# Patient Record
Sex: FEMALE | Race: OTHER | HISPANIC OR LATINO | URBAN - METROPOLITAN AREA
[De-identification: names, ages, dates, MRNs, and addresses within clinical notes are randomized per-mention and may not be internally consistent; named-entity substitution may affect disease eponyms.]

---

## 2023-10-13 ENCOUNTER — APPOINTMENT (RX ONLY)
Dept: URBAN - METROPOLITAN AREA CLINIC 86 | Facility: CLINIC | Age: 33
Setting detail: DERMATOLOGY
End: 2023-10-13

## 2023-10-13 DIAGNOSIS — L259 CONTACT DERMATITIS AND OTHER ECZEMA, UNSPECIFIED CAUSE: ICD-10-CM | Status: INADEQUATELY CONTROLLED

## 2023-10-13 PROBLEM — L23.9 ALLERGIC CONTACT DERMATITIS, UNSPECIFIED CAUSE: Status: ACTIVE | Noted: 2023-10-13

## 2023-10-13 PROCEDURE — ? DEFER

## 2023-10-13 PROCEDURE — 99204 OFFICE O/P NEW MOD 45 MIN: CPT

## 2023-10-13 PROCEDURE — ? COUNSELING

## 2023-10-13 PROCEDURE — ? PRESCRIPTION

## 2023-10-13 PROCEDURE — ? TREATMENT REGIMEN

## 2023-10-13 RX ORDER — TACROLIMUS 1 MG/G
OINTMENT TOPICAL
Qty: 60 | Refills: 0 | Status: ERX | COMMUNITY
Start: 2023-10-13

## 2023-10-13 RX ORDER — CLOBETASOL PROPIONATE 0.5 MG/G
OINTMENT TOPICAL
Qty: 60 | Refills: 0 | Status: ERX | COMMUNITY
Start: 2023-10-13

## 2023-10-13 RX ADMIN — TACROLIMUS: 1 OINTMENT TOPICAL at 00:00

## 2023-10-13 RX ADMIN — CLOBETASOL PROPIONATE: 0.5 OINTMENT TOPICAL at 00:00

## 2023-10-13 ASSESSMENT — LOCATION DETAILED DESCRIPTION DERM
LOCATION DETAILED: LEFT DORSAL MIDDLE FINGER METACARPOPHALANGEAL JOINT
LOCATION DETAILED: LEFT MEDIAL EYEBROW
LOCATION DETAILED: LEFT DORSAL RING FINGER METACARPOPHALANGEAL JOINT

## 2023-10-13 ASSESSMENT — LOCATION SIMPLE DESCRIPTION DERM
LOCATION SIMPLE: LEFT HAND
LOCATION SIMPLE: LEFT EYEBROW

## 2023-10-13 ASSESSMENT — LOCATION ZONE DERM
LOCATION ZONE: HAND
LOCATION ZONE: FACE

## 2023-10-13 NOTE — PROCEDURE: TREATMENT REGIMEN
Initiate Treatment: clobetasol 0.05 % ointment QHS x 2 weeks (hands) + tacrolimus 0.1% oint BID x 2 weeks (face)
Detail Level: Zone
Otc Regimen: Holly Cream products
Plan: Pt notices rash worsening after using hand sanitizers. Patch testing discussed. Issue ongoing x 4 months

## 2023-10-13 NOTE — PROCEDURE: DEFER
Procedure To Be Performed At Next Visit: Patch testing
Size Of Lesion In Cm (Optional): 0
Introduction Text (Please End With A Colon): The following procedure was deferred:
Instructions (Optional): Recommended patch testing to test for possible allergens that could be triggering this rash. Quote given for initial patch test placement. Provided info on patch testing as well.
Detail Level: Zone

## 2023-10-30 ENCOUNTER — APPOINTMENT (RX ONLY)
Dept: URBAN - METROPOLITAN AREA CLINIC 86 | Facility: CLINIC | Age: 33
Setting detail: DERMATOLOGY
End: 2023-10-30

## 2023-10-30 VITALS — WEIGHT: 148 LBS | HEIGHT: 63 IN

## 2023-10-30 DIAGNOSIS — L259 CONTACT DERMATITIS AND OTHER ECZEMA, UNSPECIFIED CAUSE: ICD-10-CM | Status: RESOLVING

## 2023-10-30 PROBLEM — L23.9 ALLERGIC CONTACT DERMATITIS, UNSPECIFIED CAUSE: Status: ACTIVE | Noted: 2023-10-30

## 2023-10-30 PROCEDURE — ? TREATMENT REGIMEN

## 2023-10-30 PROCEDURE — ? COUNSELING

## 2023-10-30 PROCEDURE — ? PATCH TESTING

## 2023-10-30 PROCEDURE — 95044 PATCH/APPLICATION TESTS: CPT

## 2023-10-30 ASSESSMENT — LOCATION SIMPLE DESCRIPTION DERM
LOCATION SIMPLE: LEFT HAND
LOCATION SIMPLE: UPPER BACK
LOCATION SIMPLE: LEFT EYEBROW

## 2023-10-30 ASSESSMENT — LOCATION ZONE DERM
LOCATION ZONE: HAND
LOCATION ZONE: TRUNK
LOCATION ZONE: FACE

## 2023-10-30 ASSESSMENT — LOCATION DETAILED DESCRIPTION DERM
LOCATION DETAILED: LEFT DORSAL RING FINGER METACARPOPHALANGEAL JOINT
LOCATION DETAILED: SUPERIOR THORACIC SPINE
LOCATION DETAILED: LEFT DORSAL MIDDLE FINGER METACARPOPHALANGEAL JOINT
LOCATION DETAILED: LEFT MEDIAL EYEBROW

## 2023-10-30 NOTE — PROCEDURE: TREATMENT REGIMEN
Detail Level: Zone
Continue Regimen: clobetasol 0.05 % ointment QHS PRN (hands) + tacrolimus 0.1% oint BID PRN (face)
Otc Regimen: Holly Cream products
Plan: Improvement in rash noted today with current regimen.\\nPt notices rash worsening after using hand sanitizers. Will begin patch testing today. Issue ongoing x 4 months

## 2023-11-01 ENCOUNTER — APPOINTMENT (RX ONLY)
Dept: URBAN - METROPOLITAN AREA CLINIC 86 | Facility: CLINIC | Age: 33
Setting detail: DERMATOLOGY
End: 2023-11-01

## 2023-11-01 VITALS — WEIGHT: 148 LBS | HEIGHT: 63 IN

## 2023-11-01 DIAGNOSIS — L259 CONTACT DERMATITIS AND OTHER ECZEMA, UNSPECIFIED CAUSE: ICD-10-CM

## 2023-11-01 PROBLEM — L23.9 ALLERGIC CONTACT DERMATITIS, UNSPECIFIED CAUSE: Status: ACTIVE | Noted: 2023-11-01

## 2023-11-01 PROCEDURE — ? PATCH TEST REMOVAL

## 2023-11-03 ENCOUNTER — APPOINTMENT (RX ONLY)
Dept: URBAN - METROPOLITAN AREA CLINIC 86 | Facility: CLINIC | Age: 33
Setting detail: DERMATOLOGY
End: 2023-11-03

## 2023-11-03 VITALS — HEIGHT: 63 IN | WEIGHT: 148 LBS

## 2023-11-03 DIAGNOSIS — L23.9 ALLERGIC CONTACT DERMATITIS, UNSPECIFIED CAUSE: ICD-10-CM

## 2023-11-03 PROCEDURE — ? COUNSELING

## 2023-11-03 PROCEDURE — 99213 OFFICE O/P EST LOW 20 MIN: CPT

## 2023-11-03 PROCEDURE — ? NORTH AMERICAN 80 PATCH TEST READING

## 2023-11-03 PROCEDURE — ? TREATMENT REGIMEN

## 2023-11-03 NOTE — PROCEDURE: [employer] 81 PATCH TEST READING
Name Of Allergen 44: Tixocortol-21-pivalate
Name Of Allergen 16: Mercapto mix
Show Negative Results In The Note?: Yes
Name Of Allergen 27: Methyldibromo glutaronitrile (MDBGN)
Allergen 61 Reaction: no reaction
Name Of Allergen 21: Diazolidinylurea (Germall II)
Name Of Allergen 17: N,N-Diphenylguanidine
Name Of Allergen 5: Imidazolidinyl urea Germall 115)
Name Of Allergen 18: Potassium dichromate
Name Of Allergen 72: Hydroxyisohexyl 3-CyclohexeneCarboxaldehyde (Lyral)
Name Of Allergen 29: Glutaral / (Glutaraldehyde)
Name Of Allergen 39: Ethyl acrylate
Name Of Allergen 42: Methyl methacrylate
Name Of Allergen 57: Cananga odorata oil / (Ylang-Ylang oil)
Name Of Allergen 80: Oleamidopropyl Dimethylamine
Name Of Allergen 66: Compositae Mix II
Name Of Allergen 31: Sesquiterpene lactone mix
Name Of Allergen 62: Polysorbate 80 / (Polyoxyethylenesorbitanmonooleate (Tween 80))
Name Of Allergen 74: Hydroperoxides of Linalool
Name Of Allergen 71: Isoamyl-p-methoxycinnamate
Name Of Allergen 8: Carba mix
Name Of Allergen 32: Thimerosal (Merthiolate)
Name Of Allergen 67: Lidocaine
Name Of Allergen 45: B-033A Budesonide
Allergen 60 Reaction: irritant reaction
Name Of Allergen 14: Quaternium-15 (Dowicil 200) / (1-(3-Chloroallyl)-3,5,5-sytsdy-3-azoniaadamantane chloride)
Name Of Allergen 46: Cocamide MELINA / (Coconut diethanolamide)
Name Of Allergen 38: Gold(I)sodium thiosulfate dihydrate
Name Of Allergen 52: Benzyl salicylate
Name Of Allergen 34: Benzophenone-3 / (2-Hydroxy-4-methoxybenzophenone)
Name Of Allergen 40: Glyceryl monothioglycolate (GMTG)
Name Of Allergen 47: Triethanolamine
Name Of Allergen 43: Cobalt(II) chloride hexahydrate
Name Of Allergen 36: Ethyleneurea, melamine formaldehyde mix
Name Of Allergen 9: Neomycin sulfate
Name Of Allergen 56: DMDM Hydantoin
Name Of Allergen 64: 2-n-Octyl-4-isothiazolin-3-one
What Reading Time Point?: 48 hour
Name Of Allergen 50: Fragrance Mix II
Name Of Allergen 69: Dibucaine hydrochloride
Name Of Allergen 10: Thiuram mix
Number Of Patches Read: 80
Name Of Allergen 70: Benzoyl Peroxide
Name Of Allergen 63: Iodopropynyl butyl carbamate
Name Of Allergen 15: 4-waep-Rsakuzqhvkemlciselctbag resin
Name Of Allergen 58: Benzyl alcohol
Name Of Allergen 78: Methylisothiazolinone + Methylchloroisothiazolinone / (Cl+-Meisothiazolinone (Molly CG 200ppm))
Name Of Allergen 3: Colophonium / (Colophony)
Name Of Allergen 33: Propolis
Name Of Allergen 2: 2-Mercaptobenzothiazole (MBT)
Name Of Allergen 60: Hydroperoxides of Limonene
Name Of Allergen 26: Paraben Mix
Name Of Allergen 73: Ethylhexyl Salicylate
Name Of Allergen 25: Disperse Orange 3
Name Of Allergen 12: Ethylenediamine dihydrochloride
Show Allergen Counseling In The Note?: No
Name Of Allergen 6: Cinnamal / (Cinnamic aldehyde)
Name Of Allergen 61: Desoximetasone
Name Of Allergen 20: Nickelsulfate hexahydrate
Name Of Allergen 59: Isopropyl myristate
Name Of Allergen 30: 2-Bromo-2-nitropropane-l,3-diol (Bronopol)
Name Of Allergen 37: 2-tert-Butyl-4-methoxyphenol (BHA)
Name Of Allergen 11: Clobetasol-17-propionate
Name Of Allergen 7: Amerchol L 101
Name Of Allergen 79: Propylene glycol
Name Of Allergen 54: Methylisothiazolinone
Name Of Allergen 4: P-Phenylenediamine (PPD
Name Of Allergen 77: Formaldehyde
Detail Level: Zone
Name Of Allergen 28: Fragrance mix
Name Of Allergen 65: Disperse Blue 106/124 Mix
Name Of Allergen 23: Bacitracin
Name Of Allergen 55: HEMA (2-Hydroxyethyl methacrylate)
Name Of Allergen 48: Textile dye mix
Name Of Allergen 19: Myroxylon pereirae resin / (Balsam [place])
Name Of Allergen 49: Tea Tree Oil
Name Of Allergen 68: Fusidic acid sodium salt
Name Of Allergen 13: Epoxy resin Bisphenol A
Name Of Allergen 76: Cocamidopropylbetaine
Name Of Allergen 24: Mixed dialkyl thiourea
Name Of Allergen 51: Disperse Yellow 3
Name Of Allergen 41: Toluenesulfonamide formaldehyde resin
Name Of Allergen 22: Tocopherol/ (DL alpha tocopherol)
Name Of Allergen 53: Decyl Glucoside
Name Of Allergen 35: Chloroxylenol (PCMX) / (4-Chloro-3,5-xylenol (PCMX))
Name Of Allergen 1: Benzocaine
Name Of Allergen 75: Amidoamine

## 2023-11-03 NOTE — PROCEDURE: TREATMENT REGIMEN
Samples Given: Opzelura (1) - ok to send for patient if she requests rx
Plan: Skin safe code: [facility] Code: [id number].
Detail Level: Zone
Discontinue Regimen: Tried and failed clobetasol ointment and tacrolimus ointment